# Patient Record
Sex: MALE | Race: WHITE | NOT HISPANIC OR LATINO | Employment: OTHER | URBAN - METROPOLITAN AREA
[De-identification: names, ages, dates, MRNs, and addresses within clinical notes are randomized per-mention and may not be internally consistent; named-entity substitution may affect disease eponyms.]

---

## 2017-05-07 ENCOUNTER — OFFICE VISIT (OUTPATIENT)
Dept: URGENT CARE | Facility: MEDICAL CENTER | Age: 82
End: 2017-05-07
Payer: MEDICARE

## 2017-05-07 PROCEDURE — 99213 OFFICE O/P EST LOW 20 MIN: CPT

## 2017-05-07 PROCEDURE — G0463 HOSPITAL OUTPT CLINIC VISIT: HCPCS

## 2018-01-18 NOTE — PROGRESS NOTES
Assessment    1  Right inguinal hernia (550 90) (K40 90)    Discussion/Summary  Discussion Summary:   Patient's symptoms are likely consistent with a right-sided inguinal hernia likely direct which is completely reducible at this point  Advised to follow-up with Gen  surgery for further evaluation and management  In the meanwhile advised to keep a close eye for any worsening of the symptoms especially worsening abdominal pain and swelling or any blood in the stools or black stools and if any, advised to go to ER  Medication Side Effects Reviewed: Possible side effects of new medications were reviewed with the patient/guardian today  Understands and agrees with treatment plan: The treatment plan was reviewed with the patient/guardian  The patient/guardian understands and agrees with the treatment plan      Chief Complaint    1  Abdominal Swelling  Chief Complaint Free Text Note Form: C/o right groin swollen bigger this am than now denies urinary or bowel symptoms denies pain also  History of Present Illness  HPI: Patient noticed swelling in the right groin area this morning which is gone now  Denies any injury or trauma or lifting anything heavy but groceries  Hospital Based Practices Required Assessment:   Pain Assessment   the patient states they do not have pain  (on a scale of 0 to 10, the patient rates the pain at 0 )   Abuse And Domestic Violence Screen    Yes, the patient is safe at home  The patient states no one is hurting them  Depression And Suicide Screen  No, the patient has not had thoughts of hurting themself  No, the patient has not felt depressed in the past 7 days  Prefered Language is  [place]  Primary Language is  English  Abdominal Swelling: Eduardo Wilson presents with complaints of abdominal swelling     Associated symptoms include no abdominal discomfort, no pedal edema, no scrotal edema, no nausea, no vomiting, no anorexia, no early satiety, no jaundice, no weight gain, no shortness of breath, no orthopnea, no diarrhea and no sydnee colored stools  Review of Systems  Focused-Male:   Constitutional: no fever or chills, feels well, no tiredness, no recent weight loss or weight gain  Active Problems    1  Abdominal pain (789 00) (R10 9)   2  Closed Fracture Of One Rib (807 01)   3  Diabetes Mellitus (250 00)   4  Hiccups (786 8) (R06 6)   5  Hypertension (401 9) (I10)    Social History    · Denied: History of Alcohol Use (History)   · Denied: History of Drug Use   · Former smoker (Y43 20) (W37 913)    Surgical History    1  History of Hernia Repair   2  History of Partial Colectomy    Current Meds   1  AmLODIPine Besylate 10 MG Oral Tablet; Therapy: (Recorded:08Jul2013) to Recorded   2  Artificial Tears 1 4 % Ophthalmic Solution; Therapy: (Recorded:08Jul2013) to Recorded   3  EC-81 Aspirin 81 MG Oral Tablet Delayed Release; Therapy: (Recorded:08Jul2013) to Recorded   4  Fish Oil OIL; Therapy: (Recorded:08Jul2013) to Recorded   5  Flomax 0 4 MG Oral Capsule; Therapy: (Recorded:08Jul2013) to Recorded   6  GlipiZIDE 5 MG Oral Tablet; Therapy: (Recorded:08Jul2013) to Recorded   7  Hydrochlorothiazide 25 MG Oral Tablet; Therapy: (Recorded:08Jul2013) to Recorded   8  Hydrocodone-Acetaminophen 7 5-325 MG Oral Tablet; TAKE 1 TABLET 3 TIMES DAILY AS   NEEDED FOR PAIN;   Therapy: [id number] to (Evaluate:06Mar2014); Last Rx:[id number] Ordered   9  Januvia 50 MG Oral Tablet; Therapy: (Recorded:08Jul2013) to Recorded   10  Lisinopril 40 MG Oral Tablet; Therapy: (Recorded:08Jul2013) to Recorded   11  Magnesium 250 MG Oral Tablet; Therapy: (Recorded:08Jul2013) to Recorded   12  Melatonin TABS; Therapy: (Recorded:08Jul2013) to Recorded   13  Metoclopramide HCl - 10 MG Oral Tablet; TAKE 1 TABLET EVERY 6 HOURS AS NEEDED; Therapy: 47DDS0591 to (Evaluate:15Gxl6777)  Requested for: 45XUJ8020; Last    Rx:[id number] Ordered   14  Vitamin B12 TABS;     Therapy: (Recorded:67Kin4116) to Recorded   15  Vitamin D3 400 UNIT Oral Capsule; Therapy: (Recorded:50Tea4479) to Recorded    Allergies    1  Sulfa Drugs    Vitals  Signs [Data Includes: Current Encounter]   Recorded: 30YJT9814 03:26PM   Temperature: 97 9 F  Heart Rate: 66  Respiration: 18  Systolic: 303  Diastolic: 56  O2 Saturation: 95  Pain Scale: 0    Physical Exam    Constitutional   General appearance: No acute distress, well appearing and well nourished  Abdomen   Abdomen: Non-tender, no masses  Abdomen is soft and nontender  Patient has swelling of the right lateral groin which is about 5-6 cm in size and which gets worse with coughing which is consistent with right inguinal hernia  Left-sided examination is unremarkable  Liver and spleen: No hepatomegaly or splenomegaly         Signatures   Electronically signed by : EUSEBIO Gandhi ; Mar 24 2016  3:44PM EST                       (Author)

## 2018-12-16 ENCOUNTER — OFFICE VISIT (OUTPATIENT)
Dept: URGENT CARE | Facility: MEDICAL CENTER | Age: 83
End: 2018-12-16
Payer: MEDICARE

## 2018-12-16 VITALS
RESPIRATION RATE: 20 BRPM | OXYGEN SATURATION: 98 % | HEART RATE: 62 BPM | TEMPERATURE: 97.6 F | DIASTOLIC BLOOD PRESSURE: 80 MMHG | SYSTOLIC BLOOD PRESSURE: 128 MMHG

## 2018-12-16 DIAGNOSIS — T16.1XXA FOREIGN BODY IN AURICLE OF RIGHT EAR, INITIAL ENCOUNTER: Primary | ICD-10-CM

## 2018-12-16 PROCEDURE — G0463 HOSPITAL OUTPT CLINIC VISIT: HCPCS | Performed by: FAMILY MEDICINE

## 2018-12-16 PROCEDURE — 99213 OFFICE O/P EST LOW 20 MIN: CPT | Performed by: FAMILY MEDICINE

## 2018-12-16 RX ORDER — PRAVASTATIN SODIUM 20 MG
20 TABLET ORAL
COMMUNITY
Start: 2018-11-15

## 2018-12-16 RX ORDER — NATEGLINIDE 60 MG/1
30 TABLET ORAL
COMMUNITY
Start: 2018-06-29

## 2018-12-16 RX ORDER — DIPHENOXYLATE HYDROCHLORIDE AND ATROPINE SULFATE 2.5; .025 MG/1; MG/1
1 TABLET ORAL
COMMUNITY

## 2018-12-16 RX ORDER — FLUDROCORTISONE ACETATE 0.1 MG/1
TABLET ORAL
COMMUNITY

## 2018-12-16 RX ORDER — WARFARIN SODIUM 2.5 MG/1
5 TABLET ORAL
COMMUNITY
Start: 2018-11-26

## 2018-12-16 RX ORDER — PANTOPRAZOLE SODIUM 40 MG/1
40 TABLET, DELAYED RELEASE ORAL
COMMUNITY
Start: 2018-09-13

## 2018-12-16 RX ORDER — CINACALCET 30 MG/1
TABLET, FILM COATED ORAL
COMMUNITY

## 2018-12-16 RX ORDER — CARVEDILOL 3.12 MG/1
3.12 TABLET ORAL
COMMUNITY
Start: 2018-06-23 | End: 2020-01-14 | Stop reason: ALTCHOICE

## 2018-12-16 NOTE — PATIENT INSTRUCTIONS
Under magnification, attempted to retrieve foreign body with alligator forceps unsuccessfully  Patient was referred to [facility] Throat specialist for further evaluation  Ear Foreign Body   WHAT YOU NEED TO KNOW:   An ear foreign body is an object that is stuck in your ear  Foreign bodies are usually trapped in the outer ear canal  This is the tube from the opening of your ear to your eardrum  DISCHARGE INSTRUCTIONS:   Medicines:   · Steroid cream:  This medicine helps decrease redness and swelling  · Antibiotics: This medicine is given to help treat or prevent an infection caused by bacteria  · Take your medicine as directed  Contact your healthcare provider if you think your medicine is not helping or if you have side effects  Tell him of her if you are allergic to any medicine  Keep a list of the medicines, vitamins, and herbs you take  Include the amounts, and when and why you take them  Bring the list or the pill bottles to follow-up visits  Carry your medicine list with you in case of an emergency  Follow up with your healthcare provider or otolaryngologist as directed:  Write down your questions so you remember to ask them during your visits  Contact your healthcare provider or otolaryngologist if:   · You have a fever  · You have trouble hearing, or you hear ringing  · You have questions or concerns about your condition or care  Return to the emergency department if:   · You have severe ear pain  · You have pus or blood draining from your ear  © 2017 [address] Information is for End User's use only and may not be sold, redistributed or otherwise used for commercial purposes  All illustrations and images included in CareNotes® are the copyrighted property of A D A M [employer] Inc  or William Feldman  The above information is an  only  It is not intended as medical advice for individual conditions or treatments   Talk to your doctor, nurse or pharmacist before following any medical regimen to see if it is safe and effective for you

## 2018-12-16 NOTE — PROGRESS NOTES
[facility]        NAME: Stewart Menezes is a 81 y o  male  : 1935    MRN: [id number]  DATE: 2018  TIME: 1:21 PM    Assessment and Plan   Foreign body in auricle of right ear, initial encounter [T16  1XXA]  1  Foreign body in auricle of right ear, initial encounter  Ambulatory referral to Pediatric Otolaryngology         Patient Instructions       Follow up with PCP in 3-5 days  Proceed to  ER if symptoms worsen  Chief Complaint     Chief Complaint   Patient presents with    Foreign Body in Ear     Pt states piece of hearing aid , broke off and is lodged in right ear  Denies pain         History of Present Illness       Patient here today with complaint of foreign body in right ear  He believes that part of his hearing a got lodged in his right ear 2 days ago  Denies any pain  Drainage  He recently had his hearing aid replaced  Audiologist tried unsuccessfully to remove foreign body and he was told to see a doctor to see possible foreign body could be removed          Review of Systems   Review of Systems      Current Medications       Current Outpatient Prescriptions:     ASPIRIN 81 PO, Take 81 mg by mouth, Disp: , Rfl:     CALCIUM ACETATE, PHOS BINDER, PO, Take 1,334 mg by mouth, Disp: , Rfl:     carvedilol (COREG) 3 125 mg tablet, Take 3 125 mg by mouth, Disp: , Rfl:     cinacalcet (SENSIPAR) 30 mg tablet, Take by mouth, Disp: , Rfl:     fludrocortisone (FLORINEF) 0 1 mg tablet, Take by mouth, Disp: , Rfl:     Melatonin 5 MG CAPS, Take 5 mg by mouth, Disp: , Rfl:     multivitamin (THERAGRAN) TABS, Take 1 tablet by mouth, Disp: , Rfl:     nateglinide (STARLIX) 60 mg tablet, Take 30 mg by mouth, Disp: , Rfl:     pantoprazole (PROTONIX) 40 mg tablet, Take 40 mg by mouth, Disp: , Rfl:     pravastatin (PRAVACHOL) 20 mg tablet, Take 20 mg by mouth, Disp: , Rfl:     sitaGLIPtin (JANUVIA) 50 mg tablet, Take by mouth, Disp: , Rfl:     warfarin (COUMADIN) 2 5 mg tablet, Take 5 mg by mouth, Disp: , Rfl:     Current Allergies     Allergies as of 12/16/2018 - Reviewed 12/16/2018   Allergen Reaction Noted    Amoxicillin Swelling 09/03/2015    Grass extracts  [gramineae pollens] Other (See Comments) 03/02/2015    Other Other (See Comments) 03/02/2015    Sulfa antibiotics Other (See Comments) 03/11/2015            The following portions of the patient's history were reviewed and updated as appropriate: allergies, current medications, past family history, past medical history, past social history, past surgical history and problem list      Past Medical History:   Diagnosis Date    Chronic kidney disease     Hypertension        No past surgical history on file  No family history on file  Medications have been verified  Objective   /80 (BP Location: Left arm, Patient Position: Sitting, Cuff Size: Standard)   Pulse 62   Temp 97 6 °F (36 4 °C) (Tympanic)   Resp 20   SpO2 98%        Physical Exam     Physical Exam   HENT:   Right ear canal reveals irregularly shaped external canal   Foreign bodies observed to be located approximately 12 o'clock

## 2019-02-15 ENCOUNTER — OFFICE VISIT (OUTPATIENT)
Dept: URGENT CARE | Facility: MEDICAL CENTER | Age: 84
End: 2019-02-15
Payer: MEDICARE

## 2019-02-15 ENCOUNTER — APPOINTMENT (OUTPATIENT)
Dept: RADIOLOGY | Facility: MEDICAL CENTER | Age: 84
End: 2019-02-15
Payer: MEDICARE

## 2019-02-15 VITALS
HEIGHT: 72 IN | WEIGHT: 201 LBS | DIASTOLIC BLOOD PRESSURE: 66 MMHG | HEART RATE: 59 BPM | TEMPERATURE: 97 F | BODY MASS INDEX: 27.22 KG/M2 | SYSTOLIC BLOOD PRESSURE: 142 MMHG | OXYGEN SATURATION: 98 % | RESPIRATION RATE: 16 BRPM

## 2019-02-15 DIAGNOSIS — S39.92XA INJURY OF LOW BACK, INITIAL ENCOUNTER: ICD-10-CM

## 2019-02-15 DIAGNOSIS — S59.909A ELBOW INJURY, INITIAL ENCOUNTER: ICD-10-CM

## 2019-02-15 DIAGNOSIS — M79.642 HAND PAIN, LEFT: ICD-10-CM

## 2019-02-15 DIAGNOSIS — S30.0XXA CONTUSION OF LOWER BACK AND PELVIS, INITIAL ENCOUNTER: ICD-10-CM

## 2019-02-15 DIAGNOSIS — S60.222A CONTUSION OF LEFT HAND, INITIAL ENCOUNTER: Primary | ICD-10-CM

## 2019-02-15 PROCEDURE — G0463 HOSPITAL OUTPT CLINIC VISIT: HCPCS | Performed by: PHYSICIAN ASSISTANT

## 2019-02-15 PROCEDURE — 73130 X-RAY EXAM OF HAND: CPT

## 2019-02-15 PROCEDURE — 99213 OFFICE O/P EST LOW 20 MIN: CPT | Performed by: PHYSICIAN ASSISTANT

## 2019-02-15 PROCEDURE — 72100 X-RAY EXAM L-S SPINE 2/3 VWS: CPT

## 2019-02-15 PROCEDURE — 73080 X-RAY EXAM OF ELBOW: CPT

## 2019-02-15 NOTE — PATIENT INSTRUCTIONS
Tylenol as needed for pain control  Follow up with PCP in 3-5 days  Proceed to  ER if symptoms worsen  Contusion in Adults   AMBULATORY CARE:   A contusion  is a bruise that appears on your skin after an injury  A bruise happens when small blood vessels tear but skin does not  When blood vessels tear, blood leaks into nearby tissue, such as soft tissue or muscle  Other signs and symptoms you may have with a contusion:   · Pain that increases when you touch the bruise, walk, or use the area around the bruise    · Swelling or a lump at the site of the bruise or near it    · Red, blue, or black skin that may change to green or yellow after a few days           · Stiffness or problems moving the bruised area of your body  Seek care immediately if:   · You have new trouble moving the injured area  · You have tingling or numbness in or near the injured area  · Your hand or foot below the bruise gets cold or turns pale  Contact your healthcare provider if:   · You find a new lump in the injured area  · Your symptoms do not improve with treatment after 4 to 5 days  · You have questions or concerns about your condition or care  Treatment for a contusion  may include any of the following:  · NSAIDs , such as ibuprofen, help decrease swelling, pain, and fever  This medicine is available with or without a doctor's order  NSAIDs can cause stomach bleeding or kidney problems in certain people  If you take blood thinner medicine, always ask your healthcare provider if NSAIDs are safe for you  Always read the medicine label and follow directions  · Prescription pain medicine  may be given  Do not wait until the pain is severe before you take your medicine  · Aspiration  is a procedure used to drain pooled blood in your muscle  This may help prevent increased pressure in the muscle  · Surgery  may be done to repair a tear in the muscle or relieve pressure in the muscle caused by swelling    Help a contusion heal:   · Rest the injured area  or use it less than usual  If you bruised your leg or foot, you may need crutches or a cane to help you walk  This will help you keep weight off your injured body part  · Apply ice  to decrease swelling and pain  Ice may also help prevent tissue damage  Use an ice pack, or put crushed ice in a plastic bag  Cover it with a towel and place it on your bruise for 15 to 20 minutes every hour or as directed  · Use compression  to support the area and decrease swelling  Wrap an elastic bandage around the area over the bruised muscle  Make sure the bandage is not too tight  You should be able to fit 1 finger between the bandage and your skin  · Elevate (raise) your injured body part  above the level of your heart to help decrease pain and swelling  Use pillows, blankets, or rolled towels to elevate the area as often as you can  · Do not drink alcohol  as directed  Alcohol may slow healing  · Do not stretch injured muscles  right after your injury  Ask your healthcare provider when and how you may safely stretch after your injury  Gentle stretches can help increase your flexibility  · Do not massage the area or put heating pads  on the bruise right after your injury  Heat and massage may slow healing  Your healthcare provider may tell you to apply heat after several days  At that time, heat will start to help the injury heal   Follow up with your healthcare provider as directed:  Write down your questions so you remember to ask them during your visits  © 2017 [address] Information is for End User's use only and may not be sold, redistributed or otherwise used for commercial purposes  All illustrations and images included in CareNotes® are the copyrighted property of [employer]  or William Feldman  The above information is an  only  It is not intended as medical advice for individual conditions or treatments   Talk to your doctor, nurse or pharmacist before following any medical regimen to see if it is safe and effective for you

## 2019-02-15 NOTE — PROGRESS NOTES
[facility]        NAME: Edna Lawrence is a 81 y o  male  : 1935    MRN: [id number]  DATE: February 15, 2019  TIME: 4:19 PM    Assessment and Plan   Contusion of left hand, initial encounter [S60 222A]  1  Contusion of left hand, initial encounter  XR hand 3+ vw left   2  Elbow injury, initial encounter  XR elbow 3+ vw right   3  Contusion of lower back and pelvis, initial encounter  XR spine lumbar 2 or 3 views injury         Patient Instructions     Tylenol as needed for pain control  Follow up with PCP in 3-5 days  Proceed to  ER if symptoms worsen  Chief Complaint     Chief Complaint   Patient presents with    Fall     fell on ice wednesday morning complaininf of pain left hand, right elbow, and right buttocks         History of Present Illness       69-year-old male presents with a fall injury  Patient reports he had a fall on Wednesday injuring his left hand right elbow and lower back  Denies striking his head  Still having some pain in those areas  Fall   The accident occurred 3 to 5 days ago  The fall occurred while walking  He fell from a height of 1 to 2 ft  He landed on concrete  Point of impact: Left hand right elbow and lower back  The pain is present in the back, left hand and right elbow  The pain is moderate  The symptoms are aggravated by movement  Pertinent negatives include no abdominal pain, bowel incontinence, fever, hearing loss, hematuria, tingling, visual change or vomiting  He has tried nothing for the symptoms  The treatment provided mild relief  Review of Systems   Review of Systems   Constitutional: Negative  Negative for fever  HENT: Negative  Eyes: Negative  Respiratory: Negative  Cardiovascular: Negative  Gastrointestinal: Negative  Negative for abdominal pain, bowel incontinence and vomiting  Genitourinary: Negative for hematuria  Musculoskeletal: Negative  Skin: Negative  Neurological: Negative  Negative for tingling  Current Medications       Current Outpatient Medications:     ASPIRIN 81 PO, Take 81 mg by mouth, Disp: , Rfl:     CALCIUM ACETATE, PHOS BINDER, PO, Take 1,334 mg by mouth, Disp: , Rfl:     carvedilol (COREG) 3 125 mg tablet, Take 3 125 mg by mouth, Disp: , Rfl:     cinacalcet (SENSIPAR) 30 mg tablet, Take by mouth, Disp: , Rfl:     fludrocortisone (FLORINEF) 0 1 mg tablet, Take by mouth, Disp: , Rfl:     Melatonin 5 MG CAPS, Take 5 mg by mouth, Disp: , Rfl:     multivitamin (THERAGRAN) TABS, Take 1 tablet by mouth, Disp: , Rfl:     nateglinide (STARLIX) 60 mg tablet, Take 30 mg by mouth, Disp: , Rfl:     pantoprazole (PROTONIX) 40 mg tablet, Take 40 mg by mouth, Disp: , Rfl:     pravastatin (PRAVACHOL) 20 mg tablet, Take 20 mg by mouth, Disp: , Rfl:     sitaGLIPtin (JANUVIA) 50 mg tablet, Take by mouth, Disp: , Rfl:     warfarin (COUMADIN) 2 5 mg tablet, Take 5 mg by mouth, Disp: , Rfl:     Current Allergies     Allergies as of 02/15/2019 - Reviewed 02/15/2019   Allergen Reaction Noted    Amoxicillin Swelling 09/03/2015    Grass extracts  [gramineae pollens] Other (See Comments) 03/02/2015    Other Other (See Comments) 03/02/2015    Sulfa antibiotics Other (See Comments) 03/11/2015            The following portions of the patient's history were reviewed and updated as appropriate: allergies, current medications, past family history, past medical history, past social history, past surgical history and problem list      Past Medical History:   Diagnosis Date    Cancer ([facility] )     Chronic kidney disease     Diabetes mellitus ([facility] )     Dialysis patient ([place] [address] )     Hypertension     Pacemaker     Ulcer duodenal hemorrhage        Past Surgical History:   Procedure Laterality Date    CORONARY ANGIOPLASTY WITH STENT PLACEMENT         No family history on file  Medications have been verified          Objective   /66   Pulse 59   Temp (!) 97 °F (36 1 °C) (Tympanic)   Resp 16   Ht 6' (1 829 m)   Wt 91 2 kg (201 lb)   SpO2 98%   BMI 27 26 kg/m²        Physical Exam     Physical Exam   Constitutional: He is oriented to person, place, and time  He appears well-developed and well-nourished  No distress  HENT:   Head: Normocephalic and atraumatic  Right Ear: External ear normal    Left Ear: External ear normal    Nose: Nose normal    Mouth/Throat: Oropharynx is clear and moist  No oropharyngeal exudate  Eyes: Conjunctivae are normal  Right eye exhibits no discharge  Left eye exhibits no discharge  Neck: Normal range of motion  Neck supple  Cardiovascular: Normal rate, regular rhythm, normal heart sounds and intact distal pulses  No murmur heard  Pulmonary/Chest: Effort normal and breath sounds normal  No respiratory distress  He has no wheezes  He has no rales  Abdominal: Soft  Bowel sounds are normal  There is no tenderness  Musculoskeletal: Normal range of motion  Lumbar back: He exhibits tenderness and pain  He exhibits normal range of motion, no bony tenderness, no swelling, no edema, no deformity, no laceration and no spasm  Back:         Right forearm: He exhibits tenderness and bony tenderness  He exhibits no swelling, no edema, no deformity and no laceration  Arms:       Left hand: He exhibits normal range of motion, no tenderness, no bony tenderness, normal two-point discrimination, normal capillary refill, no deformity, no laceration and no swelling  Normal sensation noted  Hands:  Lymphadenopathy:     He has no cervical adenopathy  Neurological: He is alert and oriented to person, place, and time  Skin: Skin is warm and dry  Psychiatric: He has a normal mood and affect  Nursing note and vitals reviewed  X-rays reviewed  Degenerative changes noted in many of the x-rays    No acute fractures noted in any of the x-rays

## 2019-03-11 ENCOUNTER — ANESTHESIA EVENT (OUTPATIENT)
Dept: GASTROENTEROLOGY | Facility: HOSPITAL | Age: 84
End: 2019-03-11
Payer: MEDICARE

## 2019-03-12 ENCOUNTER — TRANSCRIBE ORDERS (OUTPATIENT)
Dept: ADMINISTRATIVE | Facility: HOSPITAL | Age: 84
End: 2019-03-12

## 2019-03-12 ENCOUNTER — APPOINTMENT (OUTPATIENT)
Dept: LAB | Facility: HOSPITAL | Age: 84
End: 2019-03-12
Payer: MEDICARE

## 2019-03-12 ENCOUNTER — ANESTHESIA (OUTPATIENT)
Dept: GASTROENTEROLOGY | Facility: HOSPITAL | Age: 84
End: 2019-03-12
Payer: MEDICARE

## 2019-03-12 ENCOUNTER — HOSPITAL ENCOUNTER (OUTPATIENT)
Facility: HOSPITAL | Age: 84
Setting detail: OUTPATIENT SURGERY
Discharge: HOME | End: 2019-03-12
Attending: INTERNAL MEDICINE | Admitting: INTERNAL MEDICINE
Payer: MEDICARE

## 2019-03-12 VITALS
TEMPERATURE: 97.7 F | DIASTOLIC BLOOD PRESSURE: 60 MMHG | WEIGHT: 204 LBS | HEIGHT: 72 IN | OXYGEN SATURATION: 100 % | HEART RATE: 60 BPM | BODY MASS INDEX: 27.63 KG/M2 | RESPIRATION RATE: 16 BRPM | SYSTOLIC BLOOD PRESSURE: 132 MMHG

## 2019-03-12 DIAGNOSIS — D50.9 IRON DEFICIENCY ANEMIA, UNSPECIFIED IRON DEFICIENCY ANEMIA TYPE: Primary | ICD-10-CM

## 2019-03-12 DIAGNOSIS — D50.9 IRON DEFICIENCY ANEMIA, UNSPECIFIED IRON DEFICIENCY ANEMIA TYPE: ICD-10-CM

## 2019-03-12 DIAGNOSIS — D64.9 ANEMIA: ICD-10-CM

## 2019-03-12 DIAGNOSIS — K92.2 GASTROINTESTINAL HEMORRHAGE: ICD-10-CM

## 2019-03-12 DIAGNOSIS — K21.9 GASTRO-ESOPHAGEAL REFLUX DISEASE WITHOUT ESOPHAGITIS: ICD-10-CM

## 2019-03-12 LAB
ANION GAP SERPL CALCULATED.3IONS-SCNC: 8 MMOL/L (ref 4–13)
BUN SERPL-MCNC: 29 MG/DL (ref 5–25)
CALCIUM SERPL-MCNC: 8.6 MG/DL (ref 8.3–10.1)
CHLORIDE SERPL-SCNC: 101 MMOL/L (ref 100–108)
CO2 SERPL-SCNC: 32 MMOL/L (ref 21–32)
CREAT SERPL-MCNC: 4.8 MG/DL (ref 0.6–1.3)
GFR SERPL CREATININE-BSD FRML MDRD: 10 ML/MIN/1.73SQ M
GLUCOSE P FAST SERPL-MCNC: 98 MG/DL (ref 65–99)
GLUCOSE SERPL-MCNC: 94 MG/DL (ref 65–140)
GLUCOSE SERPL-MCNC: 98 MG/DL (ref 65–140)
INR PPP: 1.11 (ref 0.86–1.17)
POTASSIUM SERPL-SCNC: 4.4 MMOL/L (ref 3.5–5.3)
PROTHROMBIN TIME: 14.4 SECONDS (ref 11.8–14.2)
SODIUM SERPL-SCNC: 141 MMOL/L (ref 136–145)

## 2019-03-12 PROCEDURE — 85610 PROTHROMBIN TIME: CPT

## 2019-03-12 PROCEDURE — 82948 REAGENT STRIP/BLOOD GLUCOSE: CPT

## 2019-03-12 PROCEDURE — 80048 BASIC METABOLIC PNL TOTAL CA: CPT | Performed by: ANESTHESIOLOGY

## 2019-03-12 PROCEDURE — 88342 IMHCHEM/IMCYTCHM 1ST ANTB: CPT | Performed by: PATHOLOGY

## 2019-03-12 PROCEDURE — 88305 TISSUE EXAM BY PATHOLOGIST: CPT | Performed by: PATHOLOGY

## 2019-03-12 PROCEDURE — 36415 COLL VENOUS BLD VENIPUNCTURE: CPT

## 2019-03-12 RX ORDER — PROPOFOL 10 MG/ML
INJECTION, EMULSION INTRAVENOUS AS NEEDED
Status: DISCONTINUED | OUTPATIENT
Start: 2019-03-12 | End: 2019-03-12 | Stop reason: SURG

## 2019-03-12 RX ORDER — SODIUM CHLORIDE 9 MG/ML
125 INJECTION, SOLUTION INTRAVENOUS CONTINUOUS
Status: DISCONTINUED | OUTPATIENT
Start: 2019-03-12 | End: 2019-03-12 | Stop reason: HOSPADM

## 2019-03-12 RX ADMIN — LIDOCAINE HYDROCHLORIDE 100 MG: 20 INJECTION, SOLUTION INTRAVENOUS at 09:04

## 2019-03-12 RX ADMIN — PROPOFOL 110 MG: 10 INJECTION, EMULSION INTRAVENOUS at 09:04

## 2019-03-12 RX ADMIN — SODIUM CHLORIDE 125 ML/HR: 0.9 INJECTION, SOLUTION INTRAVENOUS at 08:35

## 2019-03-12 NOTE — DISCHARGE INSTRUCTIONS
Please call [phone] with any problems  Your to restart your Coumadin today  You had some mild gastritis possibly secondary to the baby aspirin which he take  You should avoid NSAIDs otherwise  Gastritis   WHAT YOU NEED TO KNOW:   Gastritis is inflammation or irritation of the lining of your stomach  DISCHARGE INSTRUCTIONS:   Call 911 for any of the following:   · You develop chest pain or shortness of breath  Seek care immediately if:   · You vomit blood  · You have black or bloody bowel movements  · You have severe stomach or back pain  Contact your healthcare provider if:   · You have a fever  · You have new or worsening symptoms, even after treatment  · You have questions or concerns about your condition or care  Medicines:   · Medicines  may be given to help treat a bacterial infection or decrease stomach acid  · Take your medicine as directed  Contact your healthcare provider if you think your medicine is not helping or if you have side effects  Tell him or her if you are allergic to any medicine  Keep a list of the medicines, vitamins, and herbs you take  Include the amounts, and when and why you take them  Bring the list or the pill bottles to follow-up visits  Carry your medicine list with you in case of an emergency  Manage or prevent gastritis:   · Do not smoke  Nicotine and other chemicals in cigarettes and cigars can make your symptoms worse and cause lung damage  Ask your healthcare provider for information if you currently smoke and need help to quit  E-cigarettes or smokeless tobacco still contain nicotine  Talk to your healthcare provider before you use these products  · Do not drink alcohol  Alcohol can prevent healing and make your gastritis worse  Talk to your healthcare provider if you need help to stop drinking  · Do not take NSAIDs or aspirin unless directed  These and similar medicines can cause irritation   If your healthcare provider says it is okay to take NSAIDs, take them with food  · Do not eat foods that cause irritation  Foods such as oranges and salsa can cause burning or pain  Eat a variety of healthy foods  Examples include fruits (not citrus), vegetables, low-fat dairy products, beans, whole-grain breads, and lean meats and fish  Try to eat small meals, and drink water with your meals  Do not eat for at least 3 hours before you go to bed  · Find ways to relax and decrease stress  Stress can increase stomach acid and make gastritis worse  Activities such as yoga, meditation, or listening to music can help you relax  Spend time with friends, or do things you enjoy  Follow up with your healthcare provider as directed: You may need ongoing tests or treatment, or referral to a gastroenterologist  Write down your questions so you remember to ask them during your visits  © 2017 [facility] Information is for End User's use only and may not be sold, redistributed or otherwise used for commercial purposes  All illustrations and images included in CareNotes® are the copyrighted property of [employer]  or William Feldman  The above information is an  only  It is not intended as medical advice for individual conditions or treatments  Talk to your doctor, nurse or pharmacist before following any medical regimen to see if it is safe and effective for you

## 2019-03-12 NOTE — ANESTHESIA PREPROCEDURE EVALUATION
Review of Systems/Medical History  Patient summary reviewed    No history of anesthetic complications     Cardiovascular  Exercise tolerance (METS): >4,  Pacemaker/AICD (placed 3 years ago  ), Hypertension controlled, Past MI > 6 months, Cardiac stents (s/p 2 stents )     Pulmonary  Smoker ex-smoker  ,        GI/Hepatic    GI bleeding (blood noted in stool ) , PUD,        Kidney disease (on HD MWF ) ESRD,        Endo/Other  Diabetes (HbA1C 5 6 ) well controlled type 2 Oral agent,      GYN       Hematology    Coagulation disorder currently taking oral anticoagulants,    Musculoskeletal  Negative musculoskeletal ROS        Neurology  Negative neurology ROS      Psychology   Negative psychology ROS              Physical Exam    Airway    Mallampati score: II  TM Distance: >3 FB  Neck ROM: full     Dental   No notable dental hx     Cardiovascular  Rhythm: regular, Rate: normal,     Pulmonary  Breath sounds clear to auscultation,     Other Findings        Anesthesia Plan  ASA Score- 3     Anesthesia Type- IV sedation with anesthesia with ASA Monitors  Additional Monitors:   Airway Plan:         Plan Factors-  Patient did not smoke on day of surgery  Induction- intravenous  Postoperative Plan-     Informed Consent- Anesthetic plan and risks discussed with patient  I personally reviewed this patient with the CRNA  Discussed and agreed on the Anesthesia Plan with the CRNA  Ely Boyd

## 2019-03-12 NOTE — OP NOTE
OPERATIVE REPORT  PATIENT NAME: Aly Ochoa    :  1935  MRN: [id number]  Pt Location: [facility]    SURGERY DATE: 3/12/2019    Surgeon(s) and Role:     Rd Felipe MD - Primary    Preop Diagnosis:  Gastrointestinal hemorrhage [K92 2]  Gastro-esophageal reflux disease without esophagitis [K21 9]  Anemia [D64 9]    Post-Op Diagnosis Codes:     * Gastrointestinal hemorrhage [K92 2]     * Gastro-esophageal reflux disease without esophagitis [K21 9]     * Anemia [D64 9]    Procedure(s) (LRB):  ESOPHAGOGASTRODUODENOSCOPY (EGD) (N/A)    Specimen(s):  * No specimens in log *    Estimated Blood Loss:   Minimal    Drains:  * No LDAs found *    Anesthesia Type:   IV Sedation with Anesthesia    Operative Indications:  Gastrointestinal hemorrhage [K92 2]  Gastro-esophageal reflux disease without esophagitis [K21 9]  Anemia [D64 9]      Operative Findings:  Mild erosive gastritis body stomach, nonobstructing Schatzki's ring      Complications:   None    Procedure and Technique: The upper endoscope was passed under direct visualization esophagus stomach and duodenum  The duodenum was normal   There is some mild erythema at the pylorus  The remainder of stomach was normal however in the upper body of the stomach there is scattered erosions with small amounts of coffee-ground material that certainly could have caused a heme-positive stool and may be secondary to something like a baby aspirin  Biopsies of the body of the stomach were taken to rule out H pylori the fundus and cardia stomach were normal   The endoscope was withdrawn there is a small hiatal hernia and a widely patent Schatzki's ring which was not dilated at is there is no complaints of dysphagia    Remainder of esophagus was normal    He tolerated Without apparent complication was instructed to restart his Coumadin today and will follow up in our office     I was present for the entire procedure    Patient Disposition:  hemodynamically stable    SIGNATURE: Jessenia Aleman MD  DATE: March 12, 2019  TIME: 9:08 AM

## 2019-03-24 ENCOUNTER — TRANSCRIBE ORDERS (OUTPATIENT)
Dept: ADMINISTRATIVE | Facility: HOSPITAL | Age: 84
End: 2019-03-24

## 2019-03-24 ENCOUNTER — APPOINTMENT (OUTPATIENT)
Dept: LAB | Facility: MEDICAL CENTER | Age: 84
End: 2019-03-24
Attending: INTERNAL MEDICINE
Payer: MEDICARE

## 2019-03-24 DIAGNOSIS — K92.2 ACUTE GASTROINTESTINAL HEMORRHAGE: ICD-10-CM

## 2019-03-24 DIAGNOSIS — K92.2 ACUTE GASTROINTESTINAL HEMORRHAGE: Primary | ICD-10-CM

## 2019-03-24 LAB
ERYTHROCYTE [DISTWIDTH] IN BLOOD BY AUTOMATED COUNT: 15.7 % (ref 11.6–15.1)
HCT VFR BLD AUTO: 27.4 % (ref 36.5–49.3)
HGB BLD-MCNC: 8.4 G/DL (ref 12–17)
MCH RBC QN AUTO: 31.9 PG (ref 26.8–34.3)
MCHC RBC AUTO-ENTMCNC: 30.7 G/DL (ref 31.4–37.4)
MCV RBC AUTO: 104 FL (ref 82–98)
PLATELET # BLD AUTO: 247 THOUSANDS/UL (ref 149–390)
PMV BLD AUTO: 9.3 FL (ref 8.9–12.7)
RBC # BLD AUTO: 2.63 MILLION/UL (ref 3.88–5.62)
WBC # BLD AUTO: 7.99 THOUSAND/UL (ref 4.31–10.16)

## 2019-03-24 PROCEDURE — 36415 COLL VENOUS BLD VENIPUNCTURE: CPT

## 2019-03-24 PROCEDURE — 85027 COMPLETE CBC AUTOMATED: CPT

## 2019-04-11 ENCOUNTER — ANESTHESIA EVENT (OUTPATIENT)
Dept: GASTROENTEROLOGY | Facility: HOSPITAL | Age: 84
End: 2019-04-11
Payer: MEDICARE

## 2019-04-12 ENCOUNTER — HOSPITAL ENCOUNTER (OUTPATIENT)
Facility: HOSPITAL | Age: 84
Setting detail: OUTPATIENT SURGERY
Discharge: HOME | End: 2019-04-12
Attending: INTERNAL MEDICINE | Admitting: INTERNAL MEDICINE
Payer: MEDICARE

## 2019-04-12 ENCOUNTER — ANESTHESIA (OUTPATIENT)
Dept: GASTROENTEROLOGY | Facility: HOSPITAL | Age: 84
End: 2019-04-12
Payer: MEDICARE

## 2019-04-12 VITALS
WEIGHT: 204 LBS | OXYGEN SATURATION: 99 % | HEIGHT: 72 IN | SYSTOLIC BLOOD PRESSURE: 157 MMHG | DIASTOLIC BLOOD PRESSURE: 65 MMHG | TEMPERATURE: 98.2 F | HEART RATE: 60 BPM | RESPIRATION RATE: 16 BRPM | BODY MASS INDEX: 27.63 KG/M2

## 2019-04-12 DIAGNOSIS — K92.1 MELENA: ICD-10-CM

## 2019-04-12 DIAGNOSIS — Z85.038 PERSONAL HISTORY OF OTHER MALIGNANT NEOPLASM OF LARGE INTESTINE: ICD-10-CM

## 2019-04-12 DIAGNOSIS — D64.9 ANEMIA: ICD-10-CM

## 2019-04-12 LAB — GLUCOSE SERPL-MCNC: 96 MG/DL (ref 65–140)

## 2019-04-12 PROCEDURE — 88305 TISSUE EXAM BY PATHOLOGIST: CPT | Performed by: PATHOLOGY

## 2019-04-12 PROCEDURE — 82948 REAGENT STRIP/BLOOD GLUCOSE: CPT

## 2019-04-12 RX ORDER — SODIUM CHLORIDE 9 MG/ML
125 INJECTION, SOLUTION INTRAVENOUS CONTINUOUS
Status: DISCONTINUED | OUTPATIENT
Start: 2019-04-12 | End: 2019-04-12 | Stop reason: HOSPADM

## 2019-04-12 RX ORDER — PROPOFOL 10 MG/ML
INJECTION, EMULSION INTRAVENOUS AS NEEDED
Status: DISCONTINUED | OUTPATIENT
Start: 2019-04-12 | End: 2019-04-12 | Stop reason: SURG

## 2019-04-12 RX ORDER — LIDOCAINE HYDROCHLORIDE 10 MG/ML
INJECTION, SOLUTION INFILTRATION; PERINEURAL AS NEEDED
Status: DISCONTINUED | OUTPATIENT
Start: 2019-04-12 | End: 2019-04-12 | Stop reason: SURG

## 2019-04-12 RX ADMIN — SODIUM CHLORIDE 25 ML/HR: 0.9 INJECTION, SOLUTION INTRAVENOUS at 10:41

## 2019-04-12 RX ADMIN — LIDOCAINE HYDROCHLORIDE 100 MG: 10 INJECTION, SOLUTION INFILTRATION; PERINEURAL at 11:06

## 2019-04-12 RX ADMIN — PROPOFOL 20 MG: 10 INJECTION, EMULSION INTRAVENOUS at 11:09

## 2019-04-12 RX ADMIN — PROPOFOL 50 MG: 10 INJECTION, EMULSION INTRAVENOUS at 11:06

## 2019-06-27 ENCOUNTER — OFFICE VISIT (OUTPATIENT)
Dept: URGENT CARE | Facility: MEDICAL CENTER | Age: 84
End: 2019-06-27
Payer: MEDICARE

## 2019-06-27 VITALS
OXYGEN SATURATION: 95 % | TEMPERATURE: 98.4 F | SYSTOLIC BLOOD PRESSURE: 162 MMHG | WEIGHT: 202 LBS | BODY MASS INDEX: 27.36 KG/M2 | DIASTOLIC BLOOD PRESSURE: 73 MMHG | HEIGHT: 72 IN | HEART RATE: 63 BPM | RESPIRATION RATE: 16 BRPM

## 2019-06-27 DIAGNOSIS — J30.9 ALLERGIC RHINITIS, UNSPECIFIED SEASONALITY, UNSPECIFIED TRIGGER: Primary | ICD-10-CM

## 2019-06-27 PROCEDURE — G0463 HOSPITAL OUTPT CLINIC VISIT: HCPCS | Performed by: FAMILY MEDICINE

## 2019-06-27 PROCEDURE — 99213 OFFICE O/P EST LOW 20 MIN: CPT | Performed by: FAMILY MEDICINE

## 2019-06-27 RX ORDER — FLUTICASONE PROPIONATE 50 MCG
2 SPRAY, SUSPENSION (ML) NASAL DAILY
Qty: 16 G | Refills: 0 | Status: SHIPPED | OUTPATIENT
Start: 2019-06-27

## 2019-06-27 RX ORDER — BENZONATATE 100 MG/1
100 CAPSULE ORAL 3 TIMES DAILY PRN
Qty: 20 CAPSULE | Refills: 0 | Status: SHIPPED | OUTPATIENT
Start: 2019-06-27 | End: 2020-01-14 | Stop reason: ALTCHOICE

## 2020-01-14 ENCOUNTER — OFFICE VISIT (OUTPATIENT)
Dept: URGENT CARE | Facility: MEDICAL CENTER | Age: 85
End: 2020-01-14
Payer: MEDICARE

## 2020-01-14 ENCOUNTER — APPOINTMENT (OUTPATIENT)
Dept: RADIOLOGY | Facility: MEDICAL CENTER | Age: 85
End: 2020-01-14
Payer: MEDICARE

## 2020-01-14 VITALS
WEIGHT: 197 LBS | OXYGEN SATURATION: 99 % | HEART RATE: 60 BPM | RESPIRATION RATE: 20 BRPM | SYSTOLIC BLOOD PRESSURE: 113 MMHG | DIASTOLIC BLOOD PRESSURE: 56 MMHG | BODY MASS INDEX: 26.68 KG/M2 | TEMPERATURE: 96.9 F | HEIGHT: 72 IN

## 2020-01-14 DIAGNOSIS — R05.9 COUGH: ICD-10-CM

## 2020-01-14 DIAGNOSIS — J00 ACUTE NASOPHARYNGITIS: Primary | ICD-10-CM

## 2020-01-14 PROCEDURE — G0463 HOSPITAL OUTPT CLINIC VISIT: HCPCS | Performed by: PHYSICIAN ASSISTANT

## 2020-01-14 PROCEDURE — 99213 OFFICE O/P EST LOW 20 MIN: CPT | Performed by: PHYSICIAN ASSISTANT

## 2020-01-14 PROCEDURE — 71046 X-RAY EXAM CHEST 2 VIEWS: CPT

## 2020-01-14 RX ORDER — ALBUTEROL SULFATE 90 UG/1
2 AEROSOL, METERED RESPIRATORY (INHALATION) EVERY 6 HOURS PRN
Qty: 1 INHALER | Refills: 0 | Status: SHIPPED | OUTPATIENT
Start: 2020-01-14

## 2020-01-14 RX ORDER — BENZONATATE 100 MG/1
100 CAPSULE ORAL 3 TIMES DAILY PRN
Qty: 15 CAPSULE | Refills: 0 | Status: SHIPPED | OUTPATIENT
Start: 2020-01-14

## 2020-01-14 NOTE — PATIENT INSTRUCTIONS
Use medications as directed for symptomatic relief as needed  Start using Flonase again as directed  Follow up with PCP in 3-5 days  Proceed to  ER if symptoms worsen  Cold Symptoms   AMBULATORY CARE:   Cold symptoms  include sneezing, dry throat, a stuffy nose, headache, watery eyes, and a cough  Your cough may be dry, or you may cough up mucus  You may also have muscle aches, joint pain, and tiredness  Rarely, you may have a fever  Cold symptoms occur from inflammation in your upper respiratory system caused by a virus  Most colds go away without treatment  Seek care immediately if:   · You have increased tiredness and weakness  · You are unable to eat  · Your heart is beating much faster than usual for you  · You see white spots in the back of your throat and your neck is swollen and sore to the touch  · You see pinpoint or larger reddish-purple dots on your skin  Contact your healthcare provider if:   · You have a fever higher than 102°F (38 9°C)  · You have new or worsening shortness of breath  · You have thick nasal drainage for more than 2 days  · Your symptoms do not improve or get worse within 5 days  · You have questions or concerns about your condition or care  Treatment for cold symptoms  may include NSAIDS to decrease muscle aches and fever  Cold medicines may also be given to decrease coughing, nasal stuffiness, sneezing, and a runny nose  Manage your cold symptoms: The following may help relieve cold symptoms, such as a dry throat and congestion:  · Gargle with mouthwash or warm salt water as directed  · Suck on throat lozenges or hard candy  · Use a cold or warm vaporizer or humidifier to ease your breathing  · Rest for at least 2 days and then as needed to decrease tiredness and weakness  · Use petroleum based jelly around your nostrils to decrease irritation from blowing your nose  · Drink plenty of liquids   Liquids will help thin and loosen thick mucus so you can cough it up  Liquids will also keep you hydrated  Ask your healthcare provider which liquids are best for you and how much to drink each day  Prevent the spread of germs  by washing your hands often  You can spread your cold germs to others for at least 3 days after your symptoms start  Do not share items, such as eating utensils  Cover your nose and mouth when you cough or sneeze using the crook of your elbow instead of your hands  Throw used tissues in the garbage  Do not smoke:  Smoking may worsen your symptoms and increase the length of time you feel sick  Talk with your healthcare provider if you need help to stop smoking  Follow up with your healthcare provider as directed:  Write down your questions so you remember to ask them during your visits  © 2017 [address] Information is for End User's use only and may not be sold, redistributed or otherwise used for commercial purposes  All illustrations and images included in CareNotes® are the copyrighted property of [employer]  or [employer] Feld[employer]  The above information is an  only  It is not intended as medical advice for individual conditions or treatments  Talk to your doctor, nurse or pharmacist before following any medical regimen to see if it is safe and effective for you

## 2020-01-14 NOTE — PROGRESS NOTES
[facility]        NAME: Manpreet Martin is a 81 y o  male  : 1935    MRN: [id number]  DATE: 2020  TIME: 5:21 PM    Assessment and Plan   Acute nasopharyngitis [J00]  1  Acute nasopharyngitis  XR chest pa & lateral    albuterol (PROVENTIL HFA,VENTOLIN HFA) 90 mcg/act inhaler    benzonatate (TESSALON PERLES) 100 mg capsule         Patient Instructions     Use medications as directed for symptomatic relief as needed  Start using Flonase again as directed  Follow up with PCP in 3-5 days  Proceed to  ER if symptoms worsen  Chief Complaint     Chief Complaint   Patient presents with    Cough     Patient states he ha shad a cough for atwo weeks  Patient expectorating clear mucous  Patient was seen by the Cardiologist and told to call his doctor         History of Present Illness       27-year-old male presents with 2 week history of cough that is productive  Denies any fevers or chills  No sore throat or ear pain  Has been having some runny nose  Denies any facial pain or pressure  Denies any chest pain shortness of breath wheezing or congestion  Denies any abdominal pain nausea vomiting diarrhea  Has been taking some Mucinex from time to time with minimal relief  Patient reports cough symptoms have improved and sputum is more clear    Cough   This is a new problem  The current episode started 1 to 4 weeks ago  The problem has been gradually improving  The problem occurs every few minutes  The cough is productive of sputum  Associated symptoms include rhinorrhea  Pertinent negatives include no ear pain, fever, nasal congestion, postnasal drip, shortness of breath or wheezing  Nothing aggravates the symptoms  He has tried nothing for the symptoms  The treatment provided no relief  There is no history of asthma  Review of Systems   Review of Systems   Constitutional: Negative  Negative for fever  HENT: Positive for rhinorrhea  Negative for ear pain and postnasal drip  Respiratory: Positive for cough  Negative for chest tightness, shortness of breath and wheezing  Cardiovascular: Negative  Gastrointestinal: Negative  Musculoskeletal: Negative  Skin: Negative  Neurological: Negative            Current Medications       Current Outpatient Medications:     CALCIUM ACETATE, PHOS BINDER, PO, Take 1,334 mg by mouth, Disp: , Rfl:     cinacalcet (SENSIPAR) 30 mg tablet, Take by mouth, Disp: , Rfl:     fludrocortisone (FLORINEF) 0 1 mg tablet, Take by mouth, Disp: , Rfl:     Melatonin 5 MG CAPS, Take 5 mg by mouth, Disp: , Rfl:     multivitamin (THERAGRAN) TABS, Take 1 tablet by mouth, Disp: , Rfl:     nateglinide (STARLIX) 60 mg tablet, Take 30 mg by mouth, Disp: , Rfl:     pantoprazole (PROTONIX) 40 mg tablet, Take 40 mg by mouth, Disp: , Rfl:     pravastatin (PRAVACHOL) 20 mg tablet, Take 20 mg by mouth, Disp: , Rfl:     sitaGLIPtin (JANUVIA) 50 mg tablet, Take by mouth, Disp: , Rfl:     warfarin (COUMADIN) 2 5 mg tablet, Take 5 mg by mouth, Disp: , Rfl:     albuterol (PROVENTIL HFA,VENTOLIN HFA) 90 mcg/act inhaler, Inhale 2 puffs every 6 (six) hours as needed for wheezing, Disp: 1 Inhaler, Rfl: 0    benzonatate (TESSALON PERLES) 100 mg capsule, Take 1 capsule (100 mg total) by mouth 3 (three) times a day as needed for cough, Disp: 15 capsule, Rfl: 0    fluticasone (FLONASE) 50 mcg/act nasal spray, 2 sprays into each nostril daily (Patient not taking: Reported on 1/14/2020), Disp: 16 g, Rfl: 0    Current Allergies     Allergies as of 01/14/2020 - Reviewed 01/14/2020   Allergen Reaction Noted    Amoxicillin Swelling 09/03/2015    Grass extracts  [gramineae pollens] Other (See Comments) 03/02/2015    Other Other (See Comments) 03/02/2015    Sulfa antibiotics Other (See Comments) 03/11/2015            The following portions of the patient's history were reviewed and updated as appropriate: allergies, current medications, past family history, past medical history, past social history, past surgical history and problem list      Past Medical History:   Diagnosis Date    [facility] ([facility] )     Chronic kidney disease     On dialysis    Colon cancer ([facility] )     Coronary artery disease     Diabetes mellitus ([facility] )     Dialysis patient ([place] [id number] )     Hyperlipidemia     Hypertension     Pacemaker     PUD (peptic ulcer disease)     Ulcer duodenal hemorrhage        Past Surgical History:   Procedure Laterality Date    ARTERIOVENOUS GRAFT PLACEMENT      CARDIAC SURGERY      Stents x2    COLON SURGERY  03/2010    resection    COLONOSCOPY N/A 4/12/2019    Procedure: COLONOSCOPY with polypectomies ;  Surgeon: Yvonne Kerns MD;  Location: [facility]; Service: Gastroenterology    CORONARY ANGIOPLASTY WITH STENT PLACEMENT      ESOPHAGOGASTRODUODENOSCOPY N/A 3/12/2019    Procedure: ESOPHAGOGASTRODUODENOSCOPY (EGD) with biopsy;  Surgeon: Yvonne Kerns MD;  Location: [facility]; Service: Gastroenterology    NAOMIE / Antonietta Snow / Fan Brown  2011       History reviewed  No pertinent family history  Medications have been verified  Objective   /56   Pulse 60   Temp (!) 96 9 °F (36 1 °C)   Resp 20   Ht 6' (1 829 m)   Wt 89 4 kg (197 lb)   SpO2 99%   BMI 26 72 kg/m²        Physical Exam     Physical Exam   Constitutional: He is oriented to person, place, and time  He appears well-developed and well-nourished  No distress  HENT:   Head: Normocephalic and atraumatic  Right Ear: Hearing, tympanic membrane, external ear and ear canal normal    Left Ear: Hearing, tympanic membrane, external ear and ear canal normal    Nose: Nose normal    Mouth/Throat: Uvula is midline, oropharynx is clear and moist and mucous membranes are normal  No oropharyngeal exudate  Eyes: Conjunctivae and EOM are normal  Right eye exhibits no discharge  Left eye exhibits no discharge  Neck: Normal range of motion  Neck supple     Cardiovascular: Normal rate, regular rhythm, normal heart sounds and intact distal pulses  No murmur heard  Pulmonary/Chest: Effort normal and breath sounds normal  No respiratory distress  He has no wheezes  He has no rales  Abdominal: Soft  Bowel sounds are normal  There is no tenderness  Musculoskeletal: Normal range of motion  Lymphadenopathy:     He has no cervical adenopathy  Neurological: He is alert and oriented to person, place, and time  Skin: Skin is warm and dry  Psychiatric: He has a normal mood and affect  Nursing note and vitals reviewed  Chest x-ray reviewed    No acute disease process noted

## (undated) DEVICE — Device: Brand: SINGLE USE BIOPSY FORCEPS

## (undated) DEVICE — SINGLE-USE POLYPECTOMY SNARE: Brand: ROTATABLE SNARE